# Patient Record
Sex: FEMALE
[De-identification: names, ages, dates, MRNs, and addresses within clinical notes are randomized per-mention and may not be internally consistent; named-entity substitution may affect disease eponyms.]

---

## 2024-10-09 PROBLEM — Z00.00 ENCOUNTER FOR PREVENTIVE HEALTH EXAMINATION: Status: ACTIVE | Noted: 2024-10-09

## 2024-10-24 ENCOUNTER — APPOINTMENT (OUTPATIENT)
Dept: SURGERY | Facility: CLINIC | Age: 54
End: 2024-10-24
Payer: COMMERCIAL

## 2024-10-24 VITALS
HEIGHT: 56 IN | DIASTOLIC BLOOD PRESSURE: 83 MMHG | HEART RATE: 83 BPM | SYSTOLIC BLOOD PRESSURE: 118 MMHG | BODY MASS INDEX: 34.87 KG/M2 | WEIGHT: 155 LBS

## 2024-10-24 DIAGNOSIS — Z78.9 OTHER SPECIFIED HEALTH STATUS: ICD-10-CM

## 2024-10-24 DIAGNOSIS — I10 ESSENTIAL (PRIMARY) HYPERTENSION: ICD-10-CM

## 2024-10-24 DIAGNOSIS — K80.20 CALCULUS OF GALLBLADDER W/OUT CHOLECYSTITIS W/OUT OBSTRUCTION: ICD-10-CM

## 2024-10-24 PROCEDURE — 99204 OFFICE O/P NEW MOD 45 MIN: CPT

## 2024-10-24 RX ORDER — ATORVASTATIN CALCIUM 80 MG/1
TABLET, FILM COATED ORAL
Refills: 0 | Status: ACTIVE | COMMUNITY

## 2024-10-24 RX ORDER — AMLODIPINE BESYLATE 5 MG/1
TABLET ORAL
Refills: 0 | Status: ACTIVE | COMMUNITY

## 2024-10-28 DIAGNOSIS — R79.89 OTHER SPECIFIED ABNORMAL FINDINGS OF BLOOD CHEMISTRY: ICD-10-CM

## 2024-10-29 LAB
ALBUMIN SERPL ELPH-MCNC: 4.3 G/DL
ALP BLD-CCNC: 176 U/L
ALT SERPL-CCNC: 55 U/L
ANION GAP SERPL CALC-SCNC: 12 MMOL/L
AST SERPL-CCNC: 43 U/L
BASOPHILS # BLD AUTO: 0.04 K/UL
BASOPHILS NFR BLD AUTO: 0.5 %
BILIRUB SERPL-MCNC: 0.4 MG/DL
BUN SERPL-MCNC: 16 MG/DL
CALCIUM SERPL-MCNC: 10.2 MG/DL
CHLORIDE SERPL-SCNC: 103 MMOL/L
CO2 SERPL-SCNC: 27 MMOL/L
CREAT SERPL-MCNC: 0.86 MG/DL
EGFR: 80 ML/MIN/1.73M2
EOSINOPHIL # BLD AUTO: 0.19 K/UL
EOSINOPHIL NFR BLD AUTO: 2.4 %
GLUCOSE SERPL-MCNC: 103 MG/DL
HCT VFR BLD CALC: 45.7 %
HGB BLD-MCNC: 13.6 G/DL
IMM GRANULOCYTES NFR BLD AUTO: 0.4 %
LYMPHOCYTES # BLD AUTO: 2.49 K/UL
LYMPHOCYTES NFR BLD AUTO: 31.9 %
MAN DIFF?: NORMAL
MCHC RBC-ENTMCNC: 24.6 PG
MCHC RBC-ENTMCNC: 29.8 GM/DL
MCV RBC AUTO: 82.8 FL
MONOCYTES # BLD AUTO: 0.58 K/UL
MONOCYTES NFR BLD AUTO: 7.4 %
NEUTROPHILS # BLD AUTO: 4.47 K/UL
NEUTROPHILS NFR BLD AUTO: 57.4 %
PLATELET # BLD AUTO: 194 K/UL
POTASSIUM SERPL-SCNC: 4.1 MMOL/L
PROT SERPL-MCNC: 7.4 G/DL
RBC # BLD: 5.52 M/UL
RBC # FLD: 14.2 %
SODIUM SERPL-SCNC: 143 MMOL/L
WBC # FLD AUTO: 7.8 K/UL

## 2024-11-05 ENCOUNTER — APPOINTMENT (OUTPATIENT)
Dept: MRI IMAGING | Facility: CLINIC | Age: 54
End: 2024-11-05

## 2024-11-05 DIAGNOSIS — F41.9 ANXIETY DISORDER, UNSPECIFIED: ICD-10-CM

## 2024-11-05 RX ORDER — ALPRAZOLAM 1 MG/1
1 TABLET ORAL
Qty: 1 | Refills: 0 | Status: ACTIVE | COMMUNITY
Start: 2024-11-05 | End: 1900-01-01

## 2024-11-07 ENCOUNTER — APPOINTMENT (OUTPATIENT)
Dept: MRI IMAGING | Facility: CLINIC | Age: 54
End: 2024-11-07
Payer: COMMERCIAL

## 2024-11-07 PROCEDURE — 74181 MRI ABDOMEN W/O CONTRAST: CPT

## 2024-11-07 NOTE — PRE PROCEDURE NOTE - NSPROCASSESMENT_GEN_ALL_CORE
H and P referenced above, reviewed by pre-surgical testing and compiled here for day of surgery review and attestation by the surgical team.

## 2024-11-07 NOTE — PRE PROCEDURE NOTE - PRE PROCEDURE EVALUATION
Automatic Zoom Actual Size Page Fit Page Width  50% 75% 100% 125% 150% 200% 300% 400%                             Name:  ERNESTO GARAY  MRN:  [id number]  Appointment Date:  Oct 24 2024    4:00PM  :  1970  Documented By:  CT CRISOSTOMO  Documented Status:  Final  Reason For Visit  Reason For Visit - Follow Up:   ERNESTO GARAY is a 54 year old female being seen for a consultation visit,   gallstones- abdominal pain.   Operative Date:   History of Present Illness  HPI: Patient History:   Ms. GARAY is a 54 year y/o F presents for consult visit w cc of having gallstones.   Pt has been experiencing intermittent abdominal pain/postprandial pain, for the past few months a/w nausea,   vomiting, bloating, indigestion and acid reflux. Pt had gone to [facility] - 2024 and was found to   have elevated LFTs and RUQ abdominal US; which was c/w gallstone in distended gallbladder.   Active Problems   1.   Gallstones (574.20) (K80.20)   2.   HBP (high blood pressure) (401.9) (I10)  Current Meds   1.   amLODIPine Besylate TABS;   Therapy: (Recorded:2024) to Recorded   2.   Atorvastatin Calcium TABS;   Therapy: (Recorded:2024) to Recorded  Allergies   1.   No Known Allergies  Review of Systems  Complete-Female:   Gastrointestinal:   as noted in HPI  and  abdominal pain  .   Constitutional, Eyes, ENT, Cardiovascular, Respiratory, Genitourinary, Musculoskeletal, Integumentary,   Neurological, Psychiatric, Endocrine and Heme/Lymph are otherwise negative.   Vitals  Vital Signs   Recorded: 2024 04:42PM   Systolic: 118  Diastolic: 83  Height: 4 ft 8 in  Weight: 155 lb   BMI Calculated: 34.75 kg/m2  BSA Calculated: 1.59 m2  Heart Rate: 83  Physical Exam    Name:  ERNESTO GARAY  MRN:  [id number]  Appointment Date:  Oct 24 2024    4:00PM  :  1970  Documented By:  CT CRISOSTOMO  Documented Status:  Final  Reason For Visit  Reason For Visit - Follow Up:   ERNESTO GARAY is a 54 year old female being seen for a consultation visit,   gallstones- abdominal pain.   Operative Date:   History of Present Illness  HPI: Patient History:   Ms. GARAY is a 54 year y/o F presents for consult visit w cc of having gallstones.   Pt has been experiencing intermittent abdominal pain/postprandial pain, for the past few months a/w nausea,   vomiting, bloating, indigestion and acid reflux. Pt had gone to [facility] - 2024 and was found to   have elevated LFTs and RUQ abdominal US; which was c/w gallstone in distended gallbladder.   Active Problems   1.   Gallstones (574.20) (K80.20)   2.   HBP (high blood pressure) (401.9) (I10)  Current Meds   1.   amLODIPine Besylate TABS;   Therapy: (Recorded:2024) to Recorded   2.   Atorvastatin Calcium TABS;   Therapy: (Recorded:2024) to Recorded  Allergies   1.   No Known Allergies  Review of Systems  Complete-Female:   Gastrointestinal:   as noted in HPI  and  abdominal pain  .   Constitutional, Eyes, ENT, Cardiovascular, Respiratory, Genitourinary, Musculoskeletal, Integumentary,   Neurological, Psychiatric, Endocrine and Heme/Lymph are otherwise negative.   Vitals  Vital Signs   Recorded: 2024 04:42PM   Systolic: 118  Diastolic: 83  Height: 4 ft 8 in  Weight: 155 lb   BMI Calculated: 34.75 kg/m2  BSA Calculated: 1.59 m2  Heart Rate: 83  Physical Exam            General Appearance:   A/Ox3; NAD. appears comfortable.   HEENT:   EOMI; sclera anicteric.   Neck:   no cervical lymphadenopathy.   Respiratory:   airway patent, no use of accessory muscles.   Gastrointestinal:.   Abd is soft, nondistended, no rebound or guarding. No abdominal masses. No abdominal   tenderness.   Skin:.   no rash or lesion  .   Neurologic:   alert  ,  oriented to person  ,  oriented to place  and  oriented to time  .   Psychiatric:   calm  .   Pre-Surgical Assessment  Recommended surgery or procedure:   robotic assisted laparoscopic cholecystectomy   Recommended PST type based on risk:   PST Chart Review (low patient risk, very low procedural risk)  Functional Capacity Assessment AA?A" Metabolic Equivalents (METs)   Patient endorses the ability to:  take care of themselves  ,  eat, dress, or use the toilet  ,  walk indoors around   the house  ,  walk 100 meters on level ground at 3 to 5 km per hour  ,  do light work around the house like   dusting or washing dishes  ,  participate in strenuous sports like swimming, singles tennis, football,   basketball, or skiing  ,  participate in moderate recreational activities like golf, bowling, or dancing  ,  do   heavy work around the house like scrubbing floors or lifting or moving heavy furniture  ,  run a short   distance  and  climb two flights of stairs or walk up a hill  Surgery or procedure potential timeframe:   3 weeks  Anesthesia History: Previous Reactions to Anesthesia:   unknown  STOP BANG Assessment:   does not snore loudly,  not tired, fatigued, or sleepy during the daytime,  not observed   to have stopped during sleep,  no hypertension, not treated for high blood pressure,  BMI less than 35 kG/m2,  not   over 50 years of age,  neck circumference less than 16 inches  and  not male (birth sex)   STOP BANG Score:   0 - 2: Low Risk  Previous blood transfusion:   no  External/internal devices being used in treatment of patient:   No devices in use in treatment   Reproductive, Female - Is patient pregnant?   No, patient is not pregnant  Assessment   1.   Gallstones (574.20) (K80.20)  Narrative:   IMP: 55 yo F with symptomatic gallstones confirmed on imaging, and history and symptoms consistent with   symptomatic cholelithiasis.   Plan   1.   Surgery Booking Form Outpatient    .    Status: Need Information - Required information    Requested for: 2024  Did the patient receive COVID 19 vaccine? : No  ERP (Enhanced Recovery Program) : No  Pacer : N  Diabetic : N  Latex Allergy : N  Medical Evaluation : No  PST Triage Evaluation : Surgeon H&P  Admission Type : OP  Anesthesia Alert : N  Anesthesia Type : General  Laterality : N/A  See Attached Form : ICG in OR  Special Equipment : Y  Length of Procedure : 1.5 hour            Procedure Description: : robotic assisted laparoscopic cholecystectomy   2.   CBC with Auto Diff; Status:Active; Requested for:[id number];    3.   Comprehensive Metabolic Panel; Status:Active; Requested for:60Jsc7904;    4.   Tobacco Use Screening; Status:Complete;      Done: [id number]   5.   Tobacco Use Screening; Status:Complete;      Done: 2024  Narrative:   CMP- today; repeat LFTs   Ms. ERNESTO GARAY was informed of significance of findings. All the options, risks and benefits were   discussed at length, including the potential to go open, small potential for bleeding, CBD injury, bile leak, and other  related complications. Informed consent for robotic assisted laparoscopic/possible open cholecystectomy and   potential risks, benefits to the planned surgery were discussed in depth.  Patient wishes to proceed with surgery. We will plan for surgery at [facility], at the next available date,   pending any required insurance pre-certification or pre-approval. Patient agrees to obtain any necessary pre-  operative evaluations and testing prior to surgery.  Patient advised to seek immediate medical attention with any acute change in symptoms or with the development   of any new or worsening symptoms. Patient's questions and concerns addressed to patient's satisfaction, and   patient verbalized an understanding of the information discussed.   Signatures   Electronically signed by : CT CRISOSTOMO MD; Oct 24 2024    7:04PM Eastern Standard Time (Author)   Name:  ERNESTO GARAY  MRN:  [id number]  Appointment Date:  Oct 24 2024    4:00PM  :  1970  Documented By:  CT CRISOSTOMO  Documented Status:  Final  Reason For Visit  Reason For Visit - Follow Up:   ERNESTO GARAY is a 54 year old female being seen for a consultation visit,   gallstones- abdominal pain.   Operative Date:   History of Present Illness  HPI: Patient History:   Ms. GARAY is a 54 year y/o F presents for consult visit w cc of having gallstones.   Pt has been experiencing intermittent abdominal pain/postprandial pain, for the past few months a/w nausea,   vomiting, bloating, indigestion and acid reflux. Pt had gone to [facility] - 2024 and was found to   have elevated LFTs and RUQ abdominal US; which was c/w gallstone in distended gallbladder.   Active Problems   1.   Gallstones (574.20) (K80.20)   2.   HBP (high blood pressure) (401.9) (I10)  Current Meds   1.   amLODIPine Besylate TABS;   Therapy: (Recorded:[id number]) to Recorded   2.   Atorvastatin Calcium TABS;   Therapy: (Recorded:81Mfh5877) to Recorded  Allergies   1.   No Known Allergies  Review of Systems  Complete-Female:   Gastrointestinal:   as noted in HPI  and  abdominal pain  .   Constitutional, Eyes, ENT, Cardiovascular, Respiratory, Genitourinary, Musculoskeletal, Integumentary,   Neurological, Psychiatric, Endocrine and Heme/Lymph are otherwise negative.   Vitals  Vital Signs   Recorded: 2024 04:42PM   Systolic: 118  Diastolic: 83  Height: 4 ft 8 in  Weight: 155 lb   BMI Calculated: 34.75 kg/m2  BSA Calculated: 1.59 m2  Heart Rate: 83  Physical Exam    Name:  ERNESTO GARAY  MRN:  [id number]  Appointment Date:  Oct 24 2024    4:00PM  :  1970  Documented By:  CT CRISOSTOMO  Documented Status:  Final  Reason For Visit  Reason For Visit - Follow Up:   ERNESTO GARAY is a 54 year old female being seen for a consultation visit,   gallstones- abdominal pain.   Operative Date:   History of Present Illness  HPI: Patient History:   Ms. GARAY is a 54 year y/o F presents for consult visit w cc of having gallstones.   Pt has been experiencing intermittent abdominal pain/postprandial pain, for the past few months a/w nausea,   vomiting, bloating, indigestion and acid reflux. Pt had gone to [facility] - 2024 and was found to   have elevated LFTs and RUQ abdominal US; which was c/w gallstone in distended gallbladder.   Active Problems   1.   Gallstones (574.20) (K80.20)   2.   HBP (high blood pressure) (401.9) (I10)  Current Meds   1.   amLODIPine Besylate TABS;   Therapy: (Recorded:2024) to Recorded   2.   Atorvastatin Calcium TABS;   Therapy: (Recorded:2024) to Recorded  Allergies   1.   No Known Allergies  Review of Systems  Complete-Female:   Gastrointestinal:   as noted in HPI  and  abdominal pain  .   Constitutional, Eyes, ENT, Cardiovascular, Respiratory, Genitourinary, Musculoskeletal, Integumentary,   Neurological, Psychiatric, Endocrine and Heme/Lymph are otherwise negative.   Vitals  Vital Signs   Recorded: 2024 04:42PM   Systolic: 118  Diastolic: 83  Height: 4 ft 8 in  Weight: 155 lb   BMI Calculated: 34.75 kg/m2  BSA Calculated: 1.59 m2  Heart Rate: 83  Physical Exam            General Appearance:   A/Ox3; NAD. appears comfortable.   HEENT:   EOMI; sclera anicteric.   Neck:   no cervical lymphadenopathy.   Respiratory:   airway patent, no use of accessory muscles.   Gastrointestinal:.   Abd is soft, nondistended, no rebound or guarding. No abdominal masses. No abdominal   tenderness.   Skin:.   no rash or lesion  .   Neurologic:   alert  ,  oriented to person  ,  oriented to place  and  oriented to time  .   Psychiatric:   calm  .   Pre-Surgical Assessment  Recommended surgery or procedure:   robotic assisted laparoscopic cholecystectomy   Recommended PST type based on risk:   PST Chart Review (low patient risk, very low procedural risk)  Functional Capacity Assessment AA?A" Metabolic Equivalents (METs)   Patient endorses the ability to:  take care of themselves  ,  eat, dress, or use the toilet  ,  walk indoors around   the house  ,  walk 100 meters on level ground at 3 to 5 km per hour  ,  do light work around the house like   dusting or washing dishes  ,  participate in strenuous sports like swimming, singles tennis, football,   basketball, or skiing  ,  participate in moderate recreational activities like golf, bowling, or dancing  ,  do   heavy work around the house like scrubbing floors or lifting or moving heavy furniture  ,  run a short   distance  and  climb two flights of stairs or walk up a hill  Surgery or procedure potential timeframe:   3 weeks  Anesthesia History: Previous Reactions to Anesthesia:   unknown  STOP BANG Assessment:   does not snore loudly,  not tired, fatigued, or sleepy during the daytime,  not observed   to have stopped during sleep,  no hypertension, not treated for high blood pressure,  BMI less than 35 kG/m2,  not   over 50 years of age,  neck circumference less than 16 inches  and  not male (birth sex)   STOP BANG Score:   0 - 2: Low Risk  Previous blood transfusion:   no  External/internal devices being used in treatment of patient:   No devices in use in treatment   Reproductive, Female - Is patient pregnant?   No, patient is not pregnant  Assessment   1.   Gallstones (574.20) (K80.20)  Narrative:   IMP: 53 yo F with symptomatic gallstones confirmed on imaging, and history and symptoms consistent with   symptomatic cholelithiasis.   Plan   1.   Surgery Booking Form Outpatient    .    Status: Need Information - Required information    Requested for: 2024  Did the patient receive COVID 19 vaccine? : No  ERP (Enhanced Recovery Program) : No  Pacer : N  Diabetic : N  Latex Allergy : N  Medical Evaluation : No  PST Triage Evaluation : Surgeon H&P  Admission Type : OP  Anesthesia Alert : N  Anesthesia Type : General  Laterality : N/A  See Attached Form : ICG in OR  Special Equipment : Y  Length of Procedure : 1.5 hour            Procedure Description: : robotic assisted laparoscopic cholecystectomy   2.   CBC with Auto Diff; Status:Active; Requested for:2024;    3.   Comprehensive Metabolic Panel; Status:Active; Requested for:2024;    4.   Tobacco Use Screening; Status:Complete;      Done: [id number]   5.   Tobacco Use Screening; Status:Complete;      Done: [id number]  Narrative:   CMP- today; repeat LFTs   Ms. ERNESTO GARAY was informed of significance of findings. All the options, risks and benefits were   discussed at length, including the potential to go open, small potential for bleeding, CBD injury, bile leak, and other  related complications. Informed consent for robotic assisted laparoscopic/possible open cholecystectomy and   potential risks, benefits to the planned surgery were discussed in depth.  Patient wishes to proceed with surgery. We will plan for surgery at [facility], at the next available date,   pending any required insurance pre-certification or pre-approval. Patient agrees to obtain any necessary pre-  operative evaluations and testing prior to surgery.  Patient advised to seek immediate medical attention with any acute change in symptoms or with the development   of any new or worsening symptoms. Patient's questions and concerns addressed to patient's satisfaction, and   patient verbalized an understanding of the information discussed.   Signatures   Electronically signed by : CT CRISOSTOMO MD; Oct 24 2024    7:04PM Eastern Standard Time (Author)

## 2024-11-08 ENCOUNTER — TRANSCRIPTION ENCOUNTER (OUTPATIENT)
Age: 54
End: 2024-11-08

## 2024-11-08 ENCOUNTER — OUTPATIENT (OUTPATIENT)
Dept: OUTPATIENT SERVICES | Facility: HOSPITAL | Age: 54
LOS: 1 days | End: 2024-11-08
Payer: COMMERCIAL

## 2024-11-08 ENCOUNTER — APPOINTMENT (OUTPATIENT)
Dept: SURGERY | Facility: HOSPITAL | Age: 54
End: 2024-11-08

## 2024-11-08 VITALS
HEART RATE: 69 BPM | HEIGHT: 56 IN | OXYGEN SATURATION: 98 % | SYSTOLIC BLOOD PRESSURE: 123 MMHG | RESPIRATION RATE: 18 BRPM | DIASTOLIC BLOOD PRESSURE: 91 MMHG | TEMPERATURE: 98 F | WEIGHT: 154.98 LBS

## 2024-11-08 VITALS
TEMPERATURE: 98 F | HEART RATE: 65 BPM | SYSTOLIC BLOOD PRESSURE: 127 MMHG | RESPIRATION RATE: 16 BRPM | OXYGEN SATURATION: 97 % | DIASTOLIC BLOOD PRESSURE: 78 MMHG

## 2024-11-08 DIAGNOSIS — K80.20 CALCULUS OF GALLBLADDER WITHOUT CHOLECYSTITIS WITHOUT OBSTRUCTION: ICD-10-CM

## 2024-11-08 LAB
ABO RH CONFIRMATION: SIGNIFICANT CHANGE UP
BLD GP AB SCN SERPL QL: SIGNIFICANT CHANGE UP
HCG UR QL: NEGATIVE — SIGNIFICANT CHANGE UP

## 2024-11-08 PROCEDURE — S2900: CPT

## 2024-11-08 PROCEDURE — 47563 LAPARO CHOLECYSTECTOMY/GRAPH: CPT

## 2024-11-08 PROCEDURE — S2900 ROBOTIC SURGICAL SYSTEM: CPT

## 2024-11-08 PROCEDURE — 86850 RBC ANTIBODY SCREEN: CPT

## 2024-11-08 PROCEDURE — 47562 LAPAROSCOPIC CHOLECYSTECTOMY: CPT

## 2024-11-08 PROCEDURE — 81025 URINE PREGNANCY TEST: CPT

## 2024-11-08 PROCEDURE — 86901 BLOOD TYPING SEROLOGIC RH(D): CPT

## 2024-11-08 PROCEDURE — 86900 BLOOD TYPING SEROLOGIC ABO: CPT

## 2024-11-08 PROCEDURE — 88304 TISSUE EXAM BY PATHOLOGIST: CPT

## 2024-11-08 PROCEDURE — 88304 TISSUE EXAM BY PATHOLOGIST: CPT | Mod: 26

## 2024-11-08 PROCEDURE — 36415 COLL VENOUS BLD VENIPUNCTURE: CPT

## 2024-11-08 DEVICE — LIGATING CLIPS WECK HEMOLOK POLYMER LARGE (PURPLE) 6: Type: IMPLANTABLE DEVICE | Status: FUNCTIONAL

## 2024-11-08 RX ORDER — INDOCYANINE GREEN AND WATER 25 MG
5 KIT INJECTION ONCE
Refills: 0 | Status: COMPLETED | OUTPATIENT
Start: 2024-11-08 | End: 2024-11-08

## 2024-11-08 RX ORDER — OXYCODONE HYDROCHLORIDE 30 MG/1
5 TABLET ORAL ONCE
Refills: 0 | Status: DISCONTINUED | OUTPATIENT
Start: 2024-11-08 | End: 2024-11-08

## 2024-11-08 RX ORDER — ONDANSETRON HYDROCHLORIDE 2 MG/ML
4 INJECTION, SOLUTION INTRAMUSCULAR; INTRAVENOUS ONCE
Refills: 0 | Status: DISCONTINUED | OUTPATIENT
Start: 2024-11-08 | End: 2024-11-08

## 2024-11-08 RX ORDER — ONDANSETRON HYDROCHLORIDE 2 MG/ML
4 INJECTION, SOLUTION INTRAMUSCULAR; INTRAVENOUS ONCE
Refills: 0 | Status: COMPLETED | OUTPATIENT
Start: 2024-11-08 | End: 2024-11-08

## 2024-11-08 RX ORDER — SODIUM CHLORIDE 9 MG/ML
3 INJECTION, SOLUTION INTRAMUSCULAR; INTRAVENOUS; SUBCUTANEOUS EVERY 8 HOURS
Refills: 0 | Status: DISCONTINUED | OUTPATIENT
Start: 2024-11-08 | End: 2024-11-08

## 2024-11-08 RX ORDER — AMLODIPINE AND BENAZEPRIL HYDROCHLORIDE 5; 20 MG/1; MG/1
1 CAPSULE ORAL
Refills: 0 | DISCHARGE

## 2024-11-08 RX ORDER — HYDROMORPHONE HCL/0.9% NACL/PF 6 MG/30 ML
0.5 PATIENT CONTROLLED ANALGESIA SYRINGE INTRAVENOUS
Refills: 0 | Status: DISCONTINUED | OUTPATIENT
Start: 2024-11-08 | End: 2024-11-08

## 2024-11-08 RX ORDER — FENTANYL CITRAT/DEXTROSE 5%/PF 1250MCG/50
25 PATIENT CONTROLLED ANALGESIA SYRINGE INTRAVENOUS
Refills: 0 | Status: DISCONTINUED | OUTPATIENT
Start: 2024-11-08 | End: 2024-11-08

## 2024-11-08 RX ORDER — OXYCODONE HYDROCHLORIDE 30 MG/1
1 TABLET ORAL
Qty: 12 | Refills: 0
Start: 2024-11-08 | End: 2024-11-10

## 2024-11-08 RX ADMIN — INDOCYANINE GREEN AND WATER 5 MILLIGRAM(S): KIT at 06:40

## 2024-11-08 RX ADMIN — ONDANSETRON HYDROCHLORIDE 4 MILLIGRAM(S): 2 INJECTION, SOLUTION INTRAMUSCULAR; INTRAVENOUS at 11:42

## 2024-11-08 NOTE — ASU DISCHARGE PLAN (ADULT/PEDIATRIC) - FINANCIAL ASSISTANCE
[facility] provides services at a reduced cost to those who are determined to be eligible through [facility]’s financial assistance program. Information regarding [facility]’s financial assistance program can be found by going to [email or web address] or by calling [phone].

## 2024-11-08 NOTE — ASU DISCHARGE PLAN (ADULT/PEDIATRIC) - CARE PROVIDER_API CALL
Vinny Mathis.  Surgery  [address]  [place] [address]  Phone: [phone]  Fax: [phone]  Follow Up Time: 1 week

## 2024-11-08 NOTE — ASU DISCHARGE PLAN (ADULT/PEDIATRIC) - ASU DC SPECIAL INSTRUCTIONSFT
Please take over the counter Tylenol and Motrin as needed for pain.  Please take Tylenol (acetaminophen) 650mg every 6 hours as needed for pain.  Please take Motrin (ibuprofen) 600mg every 6 hours as needed for pain.  Do not exceed more than 4000mg Tylenol (acetaminophen) in 24 hours.  Do not exceed more than 2400mg Motrin (ibuprofen) in 24 hours. For pain not well controlled with Tylenol and Motrin, please take oxycodone 5mg every 8 hours as needed for more severe pain.  Do not drive or operate heavy machinery while taking oxycodone.     No heavy lifting for 4-6 weeks.  Do not lift anything heavier than 15 lbs.     Resume all home medications.      Please keep all follow up appointments as directed.

## 2024-11-08 NOTE — ASU PATIENT PROFILE, ADULT - AS SC BRADEN FRICTION
(3) no apparent problem Graft Cartilage Fenestration Text: The cartilage was fenestrated with a 2mm punch biopsy to help facilitate graft survival and healing.

## 2024-11-08 NOTE — ASU DISCHARGE PLAN (ADULT/PEDIATRIC) - NS MD DC FALL RISK RISK
For information on Fall & Injury Prevention, visit: [email or web address] OR  [email or web address] OR  https://www.cdc.gov/steadi/patient.html

## 2024-11-11 ENCOUNTER — NON-APPOINTMENT (OUTPATIENT)
Age: 54
End: 2024-11-11

## 2024-11-11 PROBLEM — I10 ESSENTIAL (PRIMARY) HYPERTENSION: Chronic | Status: ACTIVE | Noted: 2024-11-08

## 2024-11-11 PROBLEM — E78.5 HYPERLIPIDEMIA, UNSPECIFIED: Chronic | Status: ACTIVE | Noted: 2024-11-08

## 2024-11-21 ENCOUNTER — APPOINTMENT (OUTPATIENT)
Dept: SURGERY | Facility: CLINIC | Age: 54
End: 2024-11-21
Payer: COMMERCIAL

## 2024-11-21 DIAGNOSIS — K80.20 CALCULUS OF GALLBLADDER W/OUT CHOLECYSTITIS W/OUT OBSTRUCTION: ICD-10-CM

## 2024-11-21 PROCEDURE — 99024 POSTOP FOLLOW-UP VISIT: CPT

## 2024-11-27 LAB — SURGICAL PATHOLOGY STUDY: SIGNIFICANT CHANGE UP

## (undated) DEVICE — PACK ROBOTIC

## (undated) DEVICE — FOR-ESU VALLEYLAB T7E14843DX: Type: DURABLE MEDICAL EQUIPMENT

## (undated) DEVICE — TUBING CONNECTING 6MM 20FT

## (undated) DEVICE — SUT VICRYL 0 27" UR-6

## (undated) DEVICE — SUT POLYSORB 0 30" GU-46

## (undated) DEVICE — XI DRAPE COLUMN

## (undated) DEVICE — ELCTR BOVIE BLADE 3/4" EXTENDED LENGTH 6"

## (undated) DEVICE — TUBING STRYKEFLOW II SUCTION / IRRIGATOR

## (undated) DEVICE — D HELP - CLEARVIEW CLEARIFY SYSTEM

## (undated) DEVICE — TROCAR COVIDIEN VERSAONE BLADED SMOOTH 5MM STANDARD

## (undated) DEVICE — ELCTR CORD FOOTSWITCH 1PLR LAPSCP 10FT

## (undated) DEVICE — SUT VICRYL 4-0 18" PS-2 UNDYED

## (undated) DEVICE — ENDOCATCH 10MM SPECIMEN POUCH

## (undated) DEVICE — XI ARM FORCEP CADIERE 8MM

## (undated) DEVICE — XI OBTURATOR OPTICAL BLADELESS 8MM

## (undated) DEVICE — XI SEAL UNIVERSIAL 5-12MM

## (undated) DEVICE — XI DRAPE ARM

## (undated) DEVICE — XI ARM SCISSOR ROUND TIP 8MM

## (undated) DEVICE — XI ARM PERMANENT CAUTERY HOOK

## (undated) DEVICE — DRAPE TOWEL BLUE STICKY

## (undated) DEVICE — DRAPE XL SHEET 77X98"